# Patient Record
Sex: MALE | Race: WHITE | NOT HISPANIC OR LATINO | RURAL
[De-identification: names, ages, dates, MRNs, and addresses within clinical notes are randomized per-mention and may not be internally consistent; named-entity substitution may affect disease eponyms.]

---

## 2017-06-16 ENCOUNTER — OTHER- (OUTPATIENT)
Dept: RURAL CLINIC 1 | Facility: CLINIC | Age: 82
Setting detail: DERMATOLOGY
End: 2017-06-16

## 2017-06-16 DIAGNOSIS — C44.42 SQUAMOUS CELL CARCINOMA OF SKIN OF SCALP AND NECK: ICD-10-CM

## 2017-06-16 PROCEDURE — 99202 OFFICE O/P NEW SF 15 MIN: CPT

## 2023-08-14 ENCOUNTER — HOSPITAL ENCOUNTER (INPATIENT)
Facility: HOSPITAL | Age: 88
LOS: 3 days | Discharge: NURSING, REHAB OR LONG-TERM CARE | End: 2023-08-17
Attending: FAMILY MEDICINE | Admitting: FAMILY MEDICINE
Payer: MEDICARE

## 2023-08-14 PROBLEM — U07.1 COVID-19 VIRUS INFECTION: Status: ACTIVE | Noted: 2023-08-14

## 2023-08-14 LAB
AMORPH CRY URNS QL MICRO: ABNORMAL
ANION GAP SERPL CALC-SCNC: 6 MMOL/L (ref 5–15)
APPEARANCE UR: CLEAR
BACTERIA URNS QL MICRO: NEGATIVE /HPF
BASOPHILS # BLD: 0 K/UL (ref 0–0.1)
BASOPHILS NFR BLD: 0 % (ref 0–1)
BILIRUB UR QL: NEGATIVE
BUN SERPL-MCNC: 29 MG/DL (ref 6–20)
BUN/CREAT SERPL: 31 (ref 12–20)
CALCIUM SERPL-MCNC: 8.6 MG/DL (ref 8.5–10.1)
CHLORIDE SERPL-SCNC: 107 MMOL/L (ref 97–108)
CO2 SERPL-SCNC: 25 MMOL/L (ref 21–32)
COLOR UR: ABNORMAL
CREAT SERPL-MCNC: 0.93 MG/DL (ref 0.7–1.3)
CRP SERPL-MCNC: 2.37 MG/DL (ref 0–0.6)
D DIMER PPP FEU-MCNC: 1.81 MG/L FEU (ref 0–0.65)
DIFFERENTIAL METHOD BLD: ABNORMAL
EOSINOPHIL # BLD: 0 K/UL (ref 0–0.4)
EOSINOPHIL NFR BLD: 0 % (ref 0–7)
EPITH CASTS URNS QL MICRO: ABNORMAL /LPF
ERYTHROCYTE [DISTWIDTH] IN BLOOD BY AUTOMATED COUNT: 12.6 % (ref 11.5–14.5)
GLUCOSE SERPL-MCNC: 144 MG/DL (ref 65–100)
GLUCOSE UR STRIP.AUTO-MCNC: NEGATIVE MG/DL
HCT VFR BLD AUTO: 38.3 % (ref 36.6–50.3)
HGB BLD-MCNC: 13 G/DL (ref 12.1–17)
HGB UR QL STRIP: ABNORMAL
IMM GRANULOCYTES # BLD AUTO: 0 K/UL
IMM GRANULOCYTES NFR BLD AUTO: 0 %
KETONES UR QL STRIP.AUTO: NEGATIVE MG/DL
LEUKOCYTE ESTERASE UR QL STRIP.AUTO: NEGATIVE
LYMPHOCYTES # BLD: 1.3 K/UL (ref 0.8–3.5)
LYMPHOCYTES NFR BLD: 6 % (ref 12–49)
MCH RBC QN AUTO: 31.7 PG (ref 26–34)
MCHC RBC AUTO-ENTMCNC: 33.9 G/DL (ref 30–36.5)
MCV RBC AUTO: 93.4 FL (ref 80–99)
MONOCYTES # BLD: 0.9 K/UL (ref 0–1)
MONOCYTES NFR BLD: 4 % (ref 5–13)
MYELOCYTES NFR BLD MANUAL: 3 %
NEUTS SEG # BLD: 18.6 K/UL (ref 1.8–8)
NEUTS SEG NFR BLD: 87 % (ref 32–75)
NITRITE UR QL STRIP.AUTO: NEGATIVE
NRBC # BLD: 0 K/UL (ref 0–0.01)
NRBC BLD-RTO: 0 PER 100 WBC
PH UR STRIP: 6.5 (ref 5–8)
PLATELET # BLD AUTO: 317 K/UL (ref 150–400)
PMV BLD AUTO: 10.7 FL (ref 8.9–12.9)
POTASSIUM SERPL-SCNC: 3.8 MMOL/L (ref 3.5–5.1)
PROCALCITONIN SERPL-MCNC: 0.15 NG/ML
PROT UR STRIP-MCNC: NEGATIVE MG/DL
RBC # BLD AUTO: 4.1 M/UL (ref 4.1–5.7)
RBC #/AREA URNS HPF: ABNORMAL /HPF (ref 0–5)
RBC MORPH BLD: ABNORMAL
SODIUM SERPL-SCNC: 138 MMOL/L (ref 136–145)
SP GR UR REFRACTOMETRY: 1.01 (ref 1–1.03)
URINE CULTURE IF INDICATED: ABNORMAL
UROBILINOGEN UR QL STRIP.AUTO: 0.2 EU/DL (ref 0.2–1)
WBC # BLD AUTO: 21.4 K/UL (ref 4.1–11.1)
WBC URNS QL MICRO: ABNORMAL /HPF (ref 0–4)

## 2023-08-14 PROCEDURE — 6360000002 HC RX W HCPCS: Performed by: FAMILY MEDICINE

## 2023-08-14 PROCEDURE — 2580000003 HC RX 258: Performed by: FAMILY MEDICINE

## 2023-08-14 PROCEDURE — 85379 FIBRIN DEGRADATION QUANT: CPT

## 2023-08-14 PROCEDURE — 36415 COLL VENOUS BLD VENIPUNCTURE: CPT

## 2023-08-14 PROCEDURE — 6370000000 HC RX 637 (ALT 250 FOR IP): Performed by: FAMILY MEDICINE

## 2023-08-14 PROCEDURE — 3E0333Z INTRODUCTION OF ANTI-INFLAMMATORY INTO PERIPHERAL VEIN, PERCUTANEOUS APPROACH: ICD-10-PCS | Performed by: HOSPITALIST

## 2023-08-14 PROCEDURE — 81001 URINALYSIS AUTO W/SCOPE: CPT

## 2023-08-14 PROCEDURE — A4216 STERILE WATER/SALINE, 10 ML: HCPCS | Performed by: FAMILY MEDICINE

## 2023-08-14 PROCEDURE — 2500000003 HC RX 250 WO HCPCS: Performed by: FAMILY MEDICINE

## 2023-08-14 PROCEDURE — 80048 BASIC METABOLIC PNL TOTAL CA: CPT

## 2023-08-14 PROCEDURE — 84145 PROCALCITONIN (PCT): CPT

## 2023-08-14 PROCEDURE — 85025 COMPLETE CBC W/AUTO DIFF WBC: CPT

## 2023-08-14 PROCEDURE — 1100000000 HC RM PRIVATE

## 2023-08-14 PROCEDURE — 86140 C-REACTIVE PROTEIN: CPT

## 2023-08-14 PROCEDURE — 94640 AIRWAY INHALATION TREATMENT: CPT

## 2023-08-14 PROCEDURE — 6360000002 HC RX W HCPCS: Performed by: HOSPITALIST

## 2023-08-14 RX ORDER — ACETYLCYSTEINE 200 MG/ML
800 SOLUTION ORAL; RESPIRATORY (INHALATION)
Status: DISCONTINUED | OUTPATIENT
Start: 2023-08-14 | End: 2023-08-15

## 2023-08-14 RX ORDER — ACETAMINOPHEN 650 MG/1
650 SUPPOSITORY RECTAL EVERY 6 HOURS PRN
Status: DISCONTINUED | OUTPATIENT
Start: 2023-08-14 | End: 2023-08-14 | Stop reason: SDUPTHER

## 2023-08-14 RX ORDER — ACETAMINOPHEN 325 MG/1
650 TABLET ORAL EVERY 6 HOURS PRN
Status: DISCONTINUED | OUTPATIENT
Start: 2023-08-14 | End: 2023-08-14 | Stop reason: SDUPTHER

## 2023-08-14 RX ORDER — SODIUM CHLORIDE 0.9 % (FLUSH) 0.9 %
5-40 SYRINGE (ML) INJECTION EVERY 12 HOURS SCHEDULED
Status: DISCONTINUED | OUTPATIENT
Start: 2023-08-14 | End: 2023-08-17 | Stop reason: HOSPADM

## 2023-08-14 RX ORDER — DEXAMETHASONE SODIUM PHOSPHATE 4 MG/ML
10 INJECTION, SOLUTION INTRA-ARTICULAR; INTRALESIONAL; INTRAMUSCULAR; INTRAVENOUS; SOFT TISSUE EVERY 24 HOURS
Status: DISCONTINUED | OUTPATIENT
Start: 2023-08-14 | End: 2023-08-15

## 2023-08-14 RX ORDER — CHLORHEXIDINE GLUCONATE 0.12 MG/ML
15 RINSE ORAL 2 TIMES DAILY
Status: DISCONTINUED | OUTPATIENT
Start: 2023-08-14 | End: 2023-08-17 | Stop reason: HOSPADM

## 2023-08-14 RX ORDER — ONDANSETRON 2 MG/ML
4 INJECTION INTRAMUSCULAR; INTRAVENOUS EVERY 6 HOURS PRN
Status: DISCONTINUED | OUTPATIENT
Start: 2023-08-14 | End: 2023-08-17 | Stop reason: HOSPADM

## 2023-08-14 RX ORDER — ENOXAPARIN SODIUM 100 MG/ML
40 INJECTION SUBCUTANEOUS DAILY
Status: DISCONTINUED | OUTPATIENT
Start: 2023-08-14 | End: 2023-08-17 | Stop reason: HOSPADM

## 2023-08-14 RX ORDER — BENZONATATE 100 MG/1
100 CAPSULE ORAL 3 TIMES DAILY PRN
Status: DISCONTINUED | OUTPATIENT
Start: 2023-08-14 | End: 2023-08-17 | Stop reason: HOSPADM

## 2023-08-14 RX ORDER — ACETAMINOPHEN 650 MG/1
650 SUPPOSITORY RECTAL EVERY 6 HOURS PRN
Status: DISCONTINUED | OUTPATIENT
Start: 2023-08-14 | End: 2023-08-17 | Stop reason: HOSPADM

## 2023-08-14 RX ORDER — SODIUM CHLORIDE 0.9 % (FLUSH) 0.9 %
5-40 SYRINGE (ML) INJECTION PRN
Status: DISCONTINUED | OUTPATIENT
Start: 2023-08-14 | End: 2023-08-17 | Stop reason: HOSPADM

## 2023-08-14 RX ORDER — ACETAMINOPHEN 325 MG/1
650 TABLET ORAL EVERY 6 HOURS PRN
Status: DISCONTINUED | OUTPATIENT
Start: 2023-08-14 | End: 2023-08-17 | Stop reason: HOSPADM

## 2023-08-14 RX ORDER — DEXAMETHASONE SODIUM PHOSPHATE 4 MG/ML
10 INJECTION, SOLUTION INTRA-ARTICULAR; INTRALESIONAL; INTRAMUSCULAR; INTRAVENOUS; SOFT TISSUE EVERY 24 HOURS
Status: DISCONTINUED | OUTPATIENT
Start: 2023-08-14 | End: 2023-08-14

## 2023-08-14 RX ORDER — POLYETHYLENE GLYCOL 3350 17 G/17G
17 POWDER, FOR SOLUTION ORAL DAILY PRN
Status: DISCONTINUED | OUTPATIENT
Start: 2023-08-14 | End: 2023-08-17 | Stop reason: HOSPADM

## 2023-08-14 RX ORDER — FUROSEMIDE 10 MG/ML
20 INJECTION INTRAMUSCULAR; INTRAVENOUS DAILY
Status: COMPLETED | OUTPATIENT
Start: 2023-08-14 | End: 2023-08-16

## 2023-08-14 RX ORDER — IPRATROPIUM BROMIDE AND ALBUTEROL SULFATE 2.5; .5 MG/3ML; MG/3ML
1 SOLUTION RESPIRATORY (INHALATION)
Status: DISCONTINUED | OUTPATIENT
Start: 2023-08-14 | End: 2023-08-15

## 2023-08-14 RX ORDER — SODIUM CHLORIDE 9 MG/ML
INJECTION, SOLUTION INTRAVENOUS PRN
Status: DISCONTINUED | OUTPATIENT
Start: 2023-08-14 | End: 2023-08-17 | Stop reason: HOSPADM

## 2023-08-14 RX ORDER — ONDANSETRON 4 MG/1
4 TABLET, ORALLY DISINTEGRATING ORAL EVERY 8 HOURS PRN
Status: DISCONTINUED | OUTPATIENT
Start: 2023-08-14 | End: 2023-08-17 | Stop reason: HOSPADM

## 2023-08-14 RX ADMIN — IPRATROPIUM BROMIDE AND ALBUTEROL SULFATE 1 DOSE: 2.5; .5 SOLUTION RESPIRATORY (INHALATION) at 16:29

## 2023-08-14 RX ADMIN — ENOXAPARIN SODIUM 40 MG: 100 INJECTION SUBCUTANEOUS at 09:42

## 2023-08-14 RX ADMIN — SODIUM CHLORIDE, PRESERVATIVE FREE 20 MG: 5 INJECTION INTRAVENOUS at 22:19

## 2023-08-14 RX ADMIN — PIPERACILLIN AND TAZOBACTAM 3375 MG: 3; .375 INJECTION, POWDER, LYOPHILIZED, FOR SOLUTION INTRAVENOUS at 07:24

## 2023-08-14 RX ADMIN — BARICITINIB 4 MG: 2 TABLET, FILM COATED ORAL at 09:41

## 2023-08-14 RX ADMIN — IPRATROPIUM BROMIDE AND ALBUTEROL SULFATE 1 DOSE: 2.5; .5 SOLUTION RESPIRATORY (INHALATION) at 08:48

## 2023-08-14 RX ADMIN — FUROSEMIDE 20 MG: 10 INJECTION, SOLUTION INTRAMUSCULAR; INTRAVENOUS at 11:35

## 2023-08-14 RX ADMIN — SODIUM CHLORIDE, PRESERVATIVE FREE 10 ML: 5 INJECTION INTRAVENOUS at 22:20

## 2023-08-14 RX ADMIN — ACETYLCYSTEINE 800 MG: 200 SOLUTION ORAL; RESPIRATORY (INHALATION) at 16:29

## 2023-08-14 RX ADMIN — SODIUM CHLORIDE, PRESERVATIVE FREE 10 ML: 5 INJECTION INTRAVENOUS at 09:46

## 2023-08-14 RX ADMIN — DEXAMETHASONE SODIUM PHOSPHATE 10 MG: 4 INJECTION, SOLUTION INTRAMUSCULAR; INTRAVENOUS at 09:42

## 2023-08-14 RX ADMIN — SODIUM CHLORIDE, PRESERVATIVE FREE 20 MG: 5 INJECTION INTRAVENOUS at 09:41

## 2023-08-14 RX ADMIN — PIPERACILLIN AND TAZOBACTAM 3375 MG: 3; .375 INJECTION, POWDER, LYOPHILIZED, FOR SOLUTION INTRAVENOUS at 15:58

## 2023-08-14 RX ADMIN — ACETYLCYSTEINE 800 MG: 200 SOLUTION ORAL; RESPIRATORY (INHALATION) at 08:48

## 2023-08-14 RX ADMIN — AZITHROMYCIN MONOHYDRATE 500 MG: 500 INJECTION, POWDER, LYOPHILIZED, FOR SOLUTION INTRAVENOUS at 09:46

## 2023-08-14 RX ADMIN — CHLORHEXIDINE GLUCONATE 15 ML: 1.2 RINSE ORAL at 11:36

## 2023-08-14 ASSESSMENT — PAIN DESCRIPTION - ORIENTATION: ORIENTATION: LEFT

## 2023-08-14 ASSESSMENT — PAIN DESCRIPTION - LOCATION: LOCATION: GROIN;LEG

## 2023-08-14 ASSESSMENT — PAIN SCALES - GENERAL: PAINLEVEL_OUTOF10: 10

## 2023-08-14 NOTE — PROGRESS NOTES
Patient refusing labs and taking oxygen off. Oxygen is 94%. He does not want a swallow evaluation at this time.  Education provided, but patient wants to rest.

## 2023-08-14 NOTE — H&P
History and Physical    Date of Service:  8/14/2023  Primary Care Provider: No primary care provider on file. Source of information: Chart review    Chief Complaint: patient does not provide      History of Presenting Illness:   Abilio Reid is a 80 y.o. male with no known significant past (pre-hospital) medical history presented as a direct admission/ transfer from [facility] in [place], [place] to [facility] with diagnoses of COVID 19 virus infection and acute hypoxic respiratory failure. Patient is a non-historian, not answering questions. Majority of history is obtained per review of transfer records from [facility].   Per these reports, patient was hospitalized at [facility] from 8/7/2023 to 8/13/2023. Patient initially was admitted 8/7/2023 after being found down on the floor at home, with cough, hypoxia. Patient was diagnosed with acute hypoxic respiratory failure, pneumonia due to COVID-19 virus infection, asymptomatic bacteriuria, elevated BNP,, leukocytosis, mild hyponatremia, dehydration, elevated D-dimer, thyroid nodule, urinary retention requiring indwelling Richey catheter insertion. Imaging has shown bilateral Monia. Patient was admitted to medical service. Patient was started on remdesivir, baricitinib, Decadron, DuoNebs, and oxygen supplementation. Patient reportedly decompensated on the floor requiring transfer to the ICU with BiPAP. Patient was weaned off of BiPAP and continued on high flow oxygen. He slowly improved, he was weaned to 3 L O2 nasal cannula. Patient reportedly completed 5-day course of IV remdesivir, empirically was treated with Rocephin and azithromycin due to concerns for possible secondary bacterial infection. Fortunately, leukocytosis increased and he had elevated procalcitonin level. Rocephin was discontinued. Zosyn was started and azithromycin was continued.   Patient elevated D-dimer 1.04. CTA of the chest was negative for PE but showed extensive consolidation with groundglass opacities right greater than left and potential thrombus in left IJ. CTA of the neck was performed but showed no thrombus in the left internal jugular vein however the did show thyroid nodule. Outpatient thyroid ultrasound was recommended but not done. Patient reportedly worked with PT/OT and was able to sit in a chair several times a day. He reportedly had decreased appetite. Per discharge record, family requested patient transferred to [facility] so that patient may be closer to family. Patient is now seen for admission to the hospitalist service. On arrival at the bedside, patient is noncooperative. He is not answering questions. He states that he wants to be left alone. Intermittently, he complains of pain in his leg thigh/ groin. There was no reports of extremity pain noted in transfer reports. Of note, bilateral venous duplex scan on 8/8/2023 showed no evidence of DVT. Since arrival here, nurse notes patient has been talking of despair refusing diagnostic tests, and treatment.        REVIEW OF SYSTEMS:  Unable to obtain review of systems as patient is not answering questions    Past Medical History:   Diagnosis Date    Hypertension       MEDICATIONS:  Prior to Admission medications    Not on File     ALLERGIES:  NO KNOWN DRUG ALLERGIES    FAMILY HISTORY:  Unknown regarding chronic family medical condition    SOCIAL HISTORY:  Smoking:  unknown  Alcohol:  unknown    Social Determinants of Health     Tobacco Use: Not on file   Alcohol Use: Not on file   Financial Resource Strain: Not on file   Food Insecurity: Not on file   Transportation Needs: Not on file   Physical Activity: Not on file   Stress: Not on file   Social Connections: Not on file   Intimate Partner Violence: Not on file   Depression: Not on file   Housing Stability: Not on file        Medications were reconciled to the best

## 2023-08-14 NOTE — CARE COORDINATION
Care Management Initial Assessment       RUR: 7%  Readmission? No  1st IM letter given? Yes -  1st  letter given: No         08/14/23 1223   Service Assessment   Patient Orientation Alert and Oriented   Cognition Alert   History Provided By Child/Family   Primary Caregiver Private caregiver   [address] is: Legal Next of Kin   PCP Verified by CM Yes   Last Visit to PCP Within last 3 months   Prior Functional Level Assistance with the following:;Shopping;Mobility;Cooking   Current Functional Level Assistance with the following:;Bathing;Dressing; Toileting;Mobility   Can patient return to prior living arrangement No   Ability to make needs known: Fair   Family able to assist with home care needs: Yes   Would you like for me to discuss the discharge plan with any other family members/significant others, and if so, who? Yes  Willy Shaffer, daughter)   Financial Resources Medicare   Social/Functional History   Lives With Alone;Home care staff   Type of Home House   Discharge Planning   Type of [facility] Alone   Patient expects to be discharged to: [facility] Discharge   Mode of Transport at Discharge Other (see comment)  (BLS vs wheelchair)   Confirm Follow Up Transport Family     CM spoke with his daughter, Farhan who provided the above information. MD would like referral sent to SNF, discussion was in IDRs. Nidhi present  from hospitalist group. Farhan, patient daughter would like referral to [facility]. Referral accepted, patient will need pt/ot milly for insurance authorization approval.     CM attempted to start auth, however [facility] will have to continue with authorization.      Bettye Zuleta, [address] Management Department  CM:[phone]

## 2023-08-14 NOTE — PROGRESS NOTES
1700 - Report received from MARIANNA Ruiz and care assumed of patient. 1710 - Assessment completed. Patient resting in semi-fowlers with his glasses and hearing aids in. Pt is very hard of hearing, even with the hearing aids. Patient refused to be readjusted in bed as well as offloading heels. Pt has soft Bps, last reading at 98/53, RN to encourage oral fluids per MD. Patient appears flat and withdrawn. RN offered assistance with dinner tray, pt refused. Will continue to encourage oral intake and turns. 1900 - Pt allowed RN and PCT to reposition him in bed, pt now semi-fowlers with HOB at 45 and heels offloaded. UA and urine culture collected and sent to lab. Pt still refusing dinner. Offered water, pt refused. 1930 - Bedside and Verbal shift change report given to Kuldip Hong RN (oncoming nurse) by Ginger Bruce RN (offgoing nurse). Report included the following information Nurse Handoff Report, Index, Intake/Output, MAR, Recent Results, and Cardiac Rhythm NSR .

## 2023-08-14 NOTE — ACP (ADVANCE CARE PLANNING)
Advance Care Planning     Advance Care Planning (ACP) Physician/NP/PA Conversation    Date of Conversation: 8/14/2023  Conducted with: Patient with Decision Making Capacity   Healthcare Decision Maker: Named in Advance Directive or Healthcare Power of  (name) daughter    Healthcare Decision Maker:  No healthcare decision makers have been documented. Click here to complete [address] including selection of the Healthcare Decision Maker Relationship (ie \"Primary\")         Care Preferences:    Hospitalization: \"If your health worsens and it becomes clear that your chance of recovery is unlikely, what would be your preference regarding hospitalization? \"  The patient would prefer hospitalization. Ventilation: \"If you were unable to breath on your own and your chance of recovery was unlikely, what would be your preference about the use of a ventilator (breathing machine) if it was available to you? \"  The patient would NOT desire the use of a ventilator. Resuscitation: \"In the event your heart stopped as a result of an underlying serious health condition, would you want attempts made to restart your heart, or would you prefer a natural death? \"  No, do NOT attempt to resuscitate.     treatment goals, benefit/burden of treatment options, artificial nutrition, ventilation preferences, hospitalization preferences, and resuscitation preferences    Conversation Outcomes / Follow-Up Plan:  ACP complete - no further action today  Reviewed DNR/DNI and patient confirms current DNR status - completed forms on file (place new order if needed)    Length of Voluntary ACP Conversation in minutes:  16 minutes    Lucille Sadler MD

## 2023-08-14 NOTE — PROGRESS NOTES
Hospitalist Progress Note  Yaa Byers MD  Answering service: [phone] from in house phone        Date of Service:  2023  NAME:  Mady Payne  :  1933  MRN:  [id number]      Admission Summary:   Mady Payne is a 80 y.o. male with no known significant past (pre-hospital) medical history presented as a direct admission/ transfer from [facility] in [place], [place] to [facility] with diagnoses of COVID 19 virus infection and acute hypoxic respiratory failure. Patient is a non-historian, not answering questions. Majority of history is obtained per review of transfer records from [facility].   Per these reports, patient was hospitalized at [facility] from 2023 to 2023. Patient initially was admitted 2023 after being found down on the floor at home, with cough, hypoxia. Patient was diagnosed with acute hypoxic respiratory failure, pneumonia due to COVID-19 virus infection, asymptomatic bacteriuria, elevated BNP,, leukocytosis, mild hyponatremia, dehydration, elevated D-dimer, thyroid nodule, urinary retention requiring indwelling Richey catheter insertion. Imaging has shown bilateral Monia. Patient was admitted to medical service. Patient was started on remdesivir, baricitinib, Decadron, DuoNebs, and oxygen supplementation. Patient reportedly decompensated on the floor requiring transfer to the ICU with BiPAP. Patient was weaned off of BiPAP and continued on high flow oxygen. He slowly improved, he was weaned to 3 L O2 nasal cannula. Patient reportedly completed 5-day course of IV remdesivir, empirically was treated with Rocephin and azithromycin due to concerns for possible secondary bacterial infection. Fortunately, leukocytosis increased and he had elevated procalcitonin level.   Rocephin was

## 2023-08-15 LAB
25(OH)D3 SERPL-MCNC: 51.3 NG/ML (ref 30–100)
ANION GAP SERPL CALC-SCNC: 10 MMOL/L (ref 5–15)
BASOPHILS # BLD: 0 K/UL (ref 0–0.1)
BASOPHILS NFR BLD: 0 % (ref 0–1)
BUN SERPL-MCNC: 30 MG/DL (ref 6–20)
BUN/CREAT SERPL: 26 (ref 12–20)
CALCIUM SERPL-MCNC: 8.7 MG/DL (ref 8.5–10.1)
CHLORIDE SERPL-SCNC: 101 MMOL/L (ref 97–108)
CO2 SERPL-SCNC: 25 MMOL/L (ref 21–32)
CREAT SERPL-MCNC: 1.16 MG/DL (ref 0.7–1.3)
DIFFERENTIAL METHOD BLD: ABNORMAL
EOSINOPHIL # BLD: 0 K/UL (ref 0–0.4)
EOSINOPHIL NFR BLD: 0 % (ref 0–7)
ERYTHROCYTE [DISTWIDTH] IN BLOOD BY AUTOMATED COUNT: 12.5 % (ref 11.5–14.5)
GLUCOSE SERPL-MCNC: 119 MG/DL (ref 65–100)
HCT VFR BLD AUTO: 44.4 % (ref 36.6–50.3)
HGB BLD-MCNC: 15.1 G/DL (ref 12.1–17)
IMM GRANULOCYTES # BLD AUTO: 0 K/UL
IMM GRANULOCYTES NFR BLD AUTO: 0 %
LYMPHOCYTES # BLD: 1.2 K/UL (ref 0.8–3.5)
LYMPHOCYTES NFR BLD: 5 % (ref 12–49)
MCH RBC QN AUTO: 31.7 PG (ref 26–34)
MCHC RBC AUTO-ENTMCNC: 34 G/DL (ref 30–36.5)
MCV RBC AUTO: 93.1 FL (ref 80–99)
METAMYELOCYTES NFR BLD MANUAL: 2 %
MONOCYTES # BLD: 0.5 K/UL (ref 0–1)
MONOCYTES NFR BLD: 2 % (ref 5–13)
MYELOCYTES NFR BLD MANUAL: 3 %
NEUTS SEG # BLD: 20.4 K/UL (ref 1.8–8)
NEUTS SEG NFR BLD: 88 % (ref 32–75)
NRBC # BLD: 0 K/UL (ref 0–0.01)
NRBC BLD-RTO: 0 PER 100 WBC
PLATELET # BLD AUTO: 325 K/UL (ref 150–400)
PMV BLD AUTO: 11.5 FL (ref 8.9–12.9)
POTASSIUM SERPL-SCNC: 5.3 MMOL/L (ref 3.5–5.1)
RBC # BLD AUTO: 4.77 M/UL (ref 4.1–5.7)
RBC MORPH BLD: ABNORMAL
SODIUM SERPL-SCNC: 136 MMOL/L (ref 136–145)
WBC # BLD AUTO: 23.2 K/UL (ref 4.1–11.1)

## 2023-08-15 PROCEDURE — A4216 STERILE WATER/SALINE, 10 ML: HCPCS | Performed by: FAMILY MEDICINE

## 2023-08-15 PROCEDURE — 94640 AIRWAY INHALATION TREATMENT: CPT

## 2023-08-15 PROCEDURE — 6360000002 HC RX W HCPCS: Performed by: HOSPITALIST

## 2023-08-15 PROCEDURE — 6370000000 HC RX 637 (ALT 250 FOR IP): Performed by: NURSE PRACTITIONER

## 2023-08-15 PROCEDURE — 85025 COMPLETE CBC W/AUTO DIFF WBC: CPT

## 2023-08-15 PROCEDURE — 6370000000 HC RX 637 (ALT 250 FOR IP): Performed by: HOSPITALIST

## 2023-08-15 PROCEDURE — 6370000000 HC RX 637 (ALT 250 FOR IP): Performed by: FAMILY MEDICINE

## 2023-08-15 PROCEDURE — 97165 OT EVAL LOW COMPLEX 30 MIN: CPT

## 2023-08-15 PROCEDURE — 97530 THERAPEUTIC ACTIVITIES: CPT

## 2023-08-15 PROCEDURE — 36415 COLL VENOUS BLD VENIPUNCTURE: CPT

## 2023-08-15 PROCEDURE — 51798 US URINE CAPACITY MEASURE: CPT

## 2023-08-15 PROCEDURE — 2580000003 HC RX 258: Performed by: FAMILY MEDICINE

## 2023-08-15 PROCEDURE — 80048 BASIC METABOLIC PNL TOTAL CA: CPT

## 2023-08-15 PROCEDURE — 97161 PT EVAL LOW COMPLEX 20 MIN: CPT

## 2023-08-15 PROCEDURE — 97116 GAIT TRAINING THERAPY: CPT

## 2023-08-15 PROCEDURE — 6360000002 HC RX W HCPCS: Performed by: FAMILY MEDICINE

## 2023-08-15 PROCEDURE — 2500000003 HC RX 250 WO HCPCS: Performed by: FAMILY MEDICINE

## 2023-08-15 PROCEDURE — 1100000000 HC RM PRIVATE

## 2023-08-15 PROCEDURE — 82306 VITAMIN D 25 HYDROXY: CPT

## 2023-08-15 RX ORDER — LIDOCAINE HYDROCHLORIDE 20 MG/ML
JELLY TOPICAL ONCE
Status: COMPLETED | OUTPATIENT
Start: 2023-08-16 | End: 2023-08-15

## 2023-08-15 RX ORDER — ACETYLCYSTEINE 200 MG/ML
800 SOLUTION ORAL; RESPIRATORY (INHALATION)
Status: DISCONTINUED | OUTPATIENT
Start: 2023-08-15 | End: 2023-08-17 | Stop reason: HOSPADM

## 2023-08-15 RX ORDER — IPRATROPIUM BROMIDE AND ALBUTEROL SULFATE 2.5; .5 MG/3ML; MG/3ML
1 SOLUTION RESPIRATORY (INHALATION)
Status: DISCONTINUED | OUTPATIENT
Start: 2023-08-15 | End: 2023-08-17 | Stop reason: HOSPADM

## 2023-08-15 RX ORDER — LIDOCAINE HYDROCHLORIDE 20 MG/ML
JELLY TOPICAL PRN
Status: DISCONTINUED | OUTPATIENT
Start: 2023-08-15 | End: 2023-08-15

## 2023-08-15 RX ADMIN — ACETYLCYSTEINE 800 MG: 200 SOLUTION ORAL; RESPIRATORY (INHALATION) at 20:17

## 2023-08-15 RX ADMIN — SODIUM CHLORIDE, PRESERVATIVE FREE 10 ML: 5 INJECTION INTRAVENOUS at 09:51

## 2023-08-15 RX ADMIN — IPRATROPIUM BROMIDE AND ALBUTEROL SULFATE 1 DOSE: 2.5; .5 SOLUTION RESPIRATORY (INHALATION) at 20:17

## 2023-08-15 RX ADMIN — SODIUM CHLORIDE, PRESERVATIVE FREE 20 MG: 5 INJECTION INTRAVENOUS at 09:47

## 2023-08-15 RX ADMIN — FUROSEMIDE 20 MG: 10 INJECTION, SOLUTION INTRAMUSCULAR; INTRAVENOUS at 09:45

## 2023-08-15 RX ADMIN — PIPERACILLIN AND TAZOBACTAM 3375 MG: 3; .375 INJECTION, POWDER, LYOPHILIZED, FOR SOLUTION INTRAVENOUS at 17:14

## 2023-08-15 RX ADMIN — AZITHROMYCIN MONOHYDRATE 500 MG: 500 INJECTION, POWDER, LYOPHILIZED, FOR SOLUTION INTRAVENOUS at 09:50

## 2023-08-15 RX ADMIN — PIPERACILLIN AND TAZOBACTAM 3375 MG: 3; .375 INJECTION, POWDER, LYOPHILIZED, FOR SOLUTION INTRAVENOUS at 00:32

## 2023-08-15 RX ADMIN — BARICITINIB 4 MG: 2 TABLET, FILM COATED ORAL at 11:02

## 2023-08-15 RX ADMIN — PIPERACILLIN AND TAZOBACTAM 3375 MG: 3; .375 INJECTION, POWDER, LYOPHILIZED, FOR SOLUTION INTRAVENOUS at 09:50

## 2023-08-15 RX ADMIN — ENOXAPARIN SODIUM 40 MG: 100 INJECTION SUBCUTANEOUS at 09:46

## 2023-08-15 RX ADMIN — CHLORHEXIDINE GLUCONATE 15 ML: 1.2 RINSE ORAL at 09:48

## 2023-08-15 RX ADMIN — DEXAMETHASONE SODIUM PHOSPHATE 10 MG: 4 INJECTION, SOLUTION INTRAMUSCULAR; INTRAVENOUS at 09:48

## 2023-08-15 RX ADMIN — LIDOCAINE HYDROCHLORIDE: 20 JELLY TOPICAL at 23:55

## 2023-08-15 RX ADMIN — SODIUM CHLORIDE, PRESERVATIVE FREE 10 ML: 5 INJECTION INTRAVENOUS at 20:15

## 2023-08-15 RX ADMIN — SODIUM CHLORIDE, PRESERVATIVE FREE 20 MG: 5 INJECTION INTRAVENOUS at 20:14

## 2023-08-15 NOTE — PLAN OF CARE
Problem: Safety - Adult  Goal: Free from fall injury  8/15/2023 1505 by Medford Litten, RN  Outcome: Progressing     Problem: Pain  Goal: Verbalizes/displays adequate comfort level or baseline comfort level  Outcome: Completed

## 2023-08-15 NOTE — PLAN OF CARE
Problem: Occupational Therapy - Adult  Goal: By Discharge: Performs self-care activities at highest level of function for planned discharge setting. See evaluation for individualized goals. Description: FUNCTIONAL STATUS PRIOR TO ADMISSION:  Patient was ambulatory using RW, mod I for toileting, has a care aide daily who will assist pt with bathing and dressing as needed. Pt is very [place] and granddaughter present to assist with PLOF. Receives Help From: Personal care attendant, Outpatient therapy for strengthening and balance,       HOME SUPPORT: Patient lived alone with care aide and family to provide assistance. Occupational Therapy Goals:  Initiated 8/15/2023  1. Patient will perform standing ADLs at sink with Supervision within 7 day(s). 2.  Patient will perform upper body dressing and lower body dressing with Supervision within 7 day(s). 3.  Patient will perform bathing with Minimal Assist within 7 day(s). 4.  Patient will perform toilet transfers with Supervision  within 7 day(s). 5.  Patient will perform all aspects of toileting with Supervision within 7 day(s). Outcome: Progressing       OCCUPATIONAL THERAPY EVALUATION    Patient: Radha Pooz (65 y.o. male)  Date: 8/15/2023  Primary Diagnosis: COVID-19 virus infection [U07.1]         Precautions: Bed Alarm   pt is very [place]    ASSESSMENT :  The patient is limited by decreased functional mobility, independence in ADLs, high-level IADLs, ROM, strength, balance. Based on the impairments listed above pt presents with decreased activity tolerance, generalized weakness, risk for falls and decline in functional status for ADLs/IADLs s/p admission for COVID 19, bilateral PNA, and acute respiratory failure. Pt transferred from other hospital where he had been on BIPAP, HFNC due to respiratory failure. Overall, pt required min A for bed mobility, sit<>stand with RW. Pt with one posterior LOB.  VSS on room air, 90% post activity up to 94% with seated need for supervision renders the patient not independent. 4. A patient's performance should be established using the best available evidence. Asking the patient, friends/relatives and nurses are the usual sources, but direct observation and common sense are also important. However direct testing is not needed. 5. Usually the patient's performance over the preceding 24-48 hours is important, but occasionally longer periods will be relevant. 6. Middle categories imply that the patient supplies over 50 per cent of the effort. 7. Use of aids to be independent is allowed. Score Interpretation (from [address] [place] [address])    Independent   60-79 Minimally independent   40-59 Partially dependent   20-39 Very dependent   <20 Totally dependent     -Wilfred Farnsworth., Barthel, DNancyW. (1965). Functional evaluation: the Barthel Index. [address] [facility] (). The Barthel activities of daily living index: self-reporting versus actual performance in the old (> or = 75 years). Journal of [address] [place] [address] 45(7), [address], JCADENCEF, Nghia Akbar., Sonal Esparza. (1999). Measuring the change in disability after inpatient rehabilitation; comparison of the responsiveness of the Barthel Index and Functional Bee Measure. Journal of Neurology, Neurosurgery, and Psychiatry, 66(4), 590-174. Jocelyn Traylor, N.J.A, SHANNAN Kern, & Dee Yu MNancyA. (2004) Assessment of post-stroke quality of life in cost-effectiveness studies: The usefulness of the Barthel Index and the EuroQoL-5D. Quality of Life Research, 13, 427-43                                                                                                                                                                                                                                 Pain Ratin/10   Pain Intervention(s):        Activity Tolerance:   Fair     After treatment:   Patient left in no apparent distress sitting up in chair, Call bell within reach, Bed/ chair alarm activated, and Caregiver / family present    COMMUNICATION/EDUCATION:   The patient's plan of care was discussed with: physical therapist and registered nurse         Thank you for this referral.  Tram Garcia OT  Minutes: 30    Occupational Therapy Evaluation Charge Determination   History Examination Decision-Making   LOW Complexity : Brief history review  LOW Complexity: 1-3 Performance deficits relating to physical, cognitive, or psychosocial skills that result in activity limitations and/or participation restrictions LOW Complexity: No comorbidities that affect functional and  no verbal  or physical assist needed to complete eval tasks   Based on the above components, the patient evaluation is determined to be of the following complexity level: Low

## 2023-08-15 NOTE — PROGRESS NOTES
Patient urinated a small amount with his bowel movement around 1300 which was unmeasured. Then at approx 1715 Patients gown, sheets were saturated in urine. Bladder scanned patient yielding 597mL, while I was setting up to straight cath patient urinated. Attempted to straight cath but was met with resistance, felt like the catheter was coiling. Bladder scanned again yielding 547mL MD Cat Notified     [phone] Instructed to have someone help me    1900 Charge RN Breanne Pritchard attempted to straight cath with a coude and was unsuccessful.      1930 MD notified

## 2023-08-15 NOTE — PROGRESS NOTES
1930 Bedside and Verbal shift change report given to Dermott (oncoming nurse) by Rachna Fernandez (offgoing nurse). Report included the following information Nurse Handoff Report, Adult Overview, Intake/Output, and MAR.      0730 Bedside and Verbal shift change report given to Thom Modi (oncoming nurse) by Dermott (offgoing nurse). Report included the following information nurse handoff report, adult overview, and MAR.

## 2023-08-15 NOTE — PROGRESS NOTES
Hospitalist Progress Note  Sara Roldan MD  Answering service: [phone] from in house phone        Date of Service:  8/15/2023  NAME:  Lakeisha Peterson  :  1933  MRN:  [id number]      Admission Summary:   Lakeisha Peterson is a 80 y.o. male with no known significant past (pre-hospital) medical history presented as a direct admission/ transfer from [facility] in [place], [place] to [facility] with diagnoses of COVID 19 virus infection and acute hypoxic respiratory failure. Patient is a non-historian, not answering questions. Majority of history is obtained per review of transfer records from [facility].   Per these reports, patient was hospitalized at [facility] from 2023 to 2023. Patient initially was admitted 2023 after being found down on the floor at home, with cough, hypoxia. Patient was diagnosed with acute hypoxic respiratory failure, pneumonia due to COVID-19 virus infection, asymptomatic bacteriuria, elevated BNP,, leukocytosis, mild hyponatremia, dehydration, elevated D-dimer, thyroid nodule, urinary retention requiring indwelling Richey catheter insertion. Imaging has shown bilateral Monia. Patient was admitted to medical service. Patient was started on remdesivir, baricitinib, Decadron, DuoNebs, and oxygen supplementation. Patient reportedly decompensated on the floor requiring transfer to the ICU with BiPAP. Patient was weaned off of BiPAP and continued on high flow oxygen. He slowly improved, he was weaned to 3 L O2 nasal cannula. Patient reportedly completed 5-day course of IV remdesivir, empirically was treated with Rocephin and azithromycin due to concerns for possible secondary bacterial infection. Fortunately, leukocytosis increased and he had elevated procalcitonin level.   Rocephin was

## 2023-08-16 ENCOUNTER — APPOINTMENT (OUTPATIENT)
Facility: HOSPITAL | Age: 88
End: 2023-08-16
Attending: FAMILY MEDICINE
Payer: MEDICARE

## 2023-08-16 PROCEDURE — 6370000000 HC RX 637 (ALT 250 FOR IP): Performed by: HOSPITALIST

## 2023-08-16 PROCEDURE — 1100000000 HC RM PRIVATE

## 2023-08-16 PROCEDURE — 2580000003 HC RX 258: Performed by: FAMILY MEDICINE

## 2023-08-16 PROCEDURE — 2700000000 HC OXYGEN THERAPY PER DAY

## 2023-08-16 PROCEDURE — 6360000002 HC RX W HCPCS: Performed by: FAMILY MEDICINE

## 2023-08-16 PROCEDURE — 70450 CT HEAD/BRAIN W/O DYE: CPT

## 2023-08-16 PROCEDURE — 6360000002 HC RX W HCPCS: Performed by: HOSPITALIST

## 2023-08-16 PROCEDURE — 94640 AIRWAY INHALATION TREATMENT: CPT

## 2023-08-16 PROCEDURE — 51702 INSERT TEMP BLADDER CATH: CPT

## 2023-08-16 PROCEDURE — A4216 STERILE WATER/SALINE, 10 ML: HCPCS | Performed by: FAMILY MEDICINE

## 2023-08-16 PROCEDURE — 2500000003 HC RX 250 WO HCPCS: Performed by: FAMILY MEDICINE

## 2023-08-16 PROCEDURE — 6370000000 HC RX 637 (ALT 250 FOR IP): Performed by: FAMILY MEDICINE

## 2023-08-16 RX ADMIN — CHLORHEXIDINE GLUCONATE 15 ML: 1.2 RINSE ORAL at 11:11

## 2023-08-16 RX ADMIN — SODIUM CHLORIDE, PRESERVATIVE FREE 20 MG: 5 INJECTION INTRAVENOUS at 10:32

## 2023-08-16 RX ADMIN — FUROSEMIDE 20 MG: 10 INJECTION, SOLUTION INTRAMUSCULAR; INTRAVENOUS at 10:33

## 2023-08-16 RX ADMIN — SODIUM CHLORIDE, PRESERVATIVE FREE 5 ML: 5 INJECTION INTRAVENOUS at 22:41

## 2023-08-16 RX ADMIN — ACETYLCYSTEINE 800 MG: 200 SOLUTION ORAL; RESPIRATORY (INHALATION) at 08:32

## 2023-08-16 RX ADMIN — PIPERACILLIN AND TAZOBACTAM 3375 MG: 3; .375 INJECTION, POWDER, LYOPHILIZED, FOR SOLUTION INTRAVENOUS at 00:08

## 2023-08-16 RX ADMIN — BARICITINIB 4 MG: 2 TABLET, FILM COATED ORAL at 11:27

## 2023-08-16 RX ADMIN — DEXAMETHASONE 6 MG: 4 TABLET ORAL at 10:32

## 2023-08-16 RX ADMIN — IPRATROPIUM BROMIDE AND ALBUTEROL SULFATE 1 DOSE: 2.5; .5 SOLUTION RESPIRATORY (INHALATION) at 20:19

## 2023-08-16 RX ADMIN — ACETYLCYSTEINE 800 MG: 200 SOLUTION ORAL; RESPIRATORY (INHALATION) at 20:19

## 2023-08-16 RX ADMIN — IPRATROPIUM BROMIDE AND ALBUTEROL SULFATE 1 DOSE: 2.5; .5 SOLUTION RESPIRATORY (INHALATION) at 08:32

## 2023-08-16 RX ADMIN — ENOXAPARIN SODIUM 40 MG: 100 INJECTION SUBCUTANEOUS at 11:12

## 2023-08-16 RX ADMIN — AZITHROMYCIN MONOHYDRATE 500 MG: 500 INJECTION, POWDER, LYOPHILIZED, FOR SOLUTION INTRAVENOUS at 10:54

## 2023-08-16 RX ADMIN — CHLORHEXIDINE GLUCONATE 15 ML: 1.2 RINSE ORAL at 22:41

## 2023-08-16 RX ADMIN — SODIUM CHLORIDE, PRESERVATIVE FREE 10 ML: 5 INJECTION INTRAVENOUS at 10:31

## 2023-08-16 NOTE — CARE COORDINATION
Transition of Care Plan:     RUR: 9%  Prior Level of Functioning:  independent    Disposition: SNF   If SNF or IPR: Date FOC offered: 8/14/23  Date [address] received: 8/14/23  Accepting facility: [address] [place] [address] of [address]  Date authorization started with reference number: 8/14/23  Date authorization received and expires: tbd  Follow up appointments: pcp/specialist   DME needed: defer to SNF   Transportation at discharge: BLS vs wheelchair   IM/IMM Medicare/ letter given: will need prior to d/c   Caregiver Contact: daughterCarmen [phone]     Discharge Caregiver contacted prior to discharge? yes  Care Conference needed? no  Barriers to discharge: insurance auth. Cm spoke with Antonio Roberson at [address] [place] [address], she said Melanee Bump is still pending.      Aleksey Yuan, RN/CRM  [phone]

## 2023-08-16 NOTE — PROGRESS NOTES
Renal Dosing/Monitoring  Medication: Famotidine   Current regimen:  20 mg IV BID  Recent Labs     08/14/23  1253 08/15/23  1323   CREATININE 0.93 1.16   BUN 29* 30*     Estimated CrCl:  47 ml/min  Plan: Change to famotidine 20 mg IV daily per [facility] P&T Committee Protocol with respect to renal function (CrCl < 50 ml/min). Pharmacy will continue to monitor patient daily and will make dosage adjustments based upon changing renal function.

## 2023-08-16 NOTE — PROGRESS NOTES
Hospitalist Progress Note  Lucille Sadler MD  Answering service: [phone] from in house phone        Date of Service:  2023  NAME:  Jorge Dumont  :  1933  MRN:  [id number]      Admission Summary:   Jorge Dumont is a 80 y.o. male with no known significant past (pre-hospital) medical history presented as a direct admission/ transfer from [facility] in [place], [place] to [facility] with diagnoses of COVID 19 virus infection and acute hypoxic respiratory failure. Patient is a non-historian, not answering questions. Majority of history is obtained per review of transfer records from [facility].   Per these reports, patient was hospitalized at [facility] from 2023 to 2023. Patient initially was admitted 2023 after being found down on the floor at home, with cough, hypoxia. Patient was diagnosed with acute hypoxic respiratory failure, pneumonia due to COVID-19 virus infection, asymptomatic bacteriuria, elevated BNP,, leukocytosis, mild hyponatremia, dehydration, elevated D-dimer, thyroid nodule, urinary retention requiring indwelling Richey catheter insertion. Imaging has shown bilateral Monia. Patient was admitted to medical service. Patient was started on remdesivir, baricitinib, Decadron, DuoNebs, and oxygen supplementation. Patient reportedly decompensated on the floor requiring transfer to the ICU with BiPAP. Patient was weaned off of BiPAP and continued on high flow oxygen. He slowly improved, he was weaned to 3 L O2 nasal cannula. Patient reportedly completed 5-day course of IV remdesivir, empirically was treated with Rocephin and azithromycin due to concerns for possible secondary bacterial infection. Fortunately, leukocytosis increased and he had elevated procalcitonin level.   Rocephin was discontinued. Zosyn was started and azithromycin was continued. Patient elevated D-dimer 1.04. CTA of the chest was negative for PE but showed extensive consolidation with groundglass opacities right greater than left and potential thrombus in left IJ. CTA of the neck was performed but showed no thrombus in the left internal jugular vein however the did show thyroid nodule. Outpatient thyroid ultrasound was recommended but not done. Patient reportedly worked with PT/OT and was able to sit in a chair several times a day. He reportedly had decreased appetite. Per discharge record, family requested patient transferred to [facility] so that patient may be closer to family. Patient is now seen for admission to the hospitalist service. On arrival at the bedside, patient is noncooperative. He is not answering questions. He states that he wants to be left alone. Intermittently, he complains of pain in his leg thigh/ groin. There was no reports of extremity pain noted in transfer reports. Of note, bilateral venous duplex scan on 8/8/2023 showed no evidence of DVT. Since arrival here, nurse notes patient has been talking of despair refusing diagnostic tests, and treatment. Interval history / Subjective:   Pt seen and examined , discussed with RN wean off oxygen as tolerates  Noticed urinary retention and Richey catheter is now back keep for 2 weeks and then voiding trial with [place] [facility] as an outpatient  Patient awaiting [facility] authorization otherwise stable for discharge     Assessment & Plan:     1. Acute respiratory failure with hypoxia  -Due to bilateral COVID-19 pneumonia  -Titrate O2 to wean off oxygen  -Nebs per COVID protocol     2. COVID-19 virus infection  -Continue dexamethasone to complete 10 days of therapy switch to oral  -Plan as above     3.   Leukocytosis unspecified  -Had been on ceftriaxone and azithromycin;  -Patient is also on high-dose of steroid he has no fever repeat Or    ondansetron (ZOFRAN) injection 4 mg  4 mg IntraVENous Q6H PRN    polyethylene glycol (GLYCOLAX) packet 17 g  17 g Oral Daily PRN    acetaminophen (TYLENOL) tablet 650 mg  650 mg Oral Q6H PRN    Or    acetaminophen (TYLENOL) suppository 650 mg  650 mg Rectal Q6H PRN    benzonatate (TESSALON) capsule 100 mg  100 mg Oral TID PRN    azithromycin (ZITHROMAX) 500 mg in sodium chloride 0.9 % 250 mL IVPB ([id number])  500 mg IntraVENous Q24H    enoxaparin (LOVENOX) injection 40 mg  40 mg SubCUTAneous Daily    baricitinib (OLUMIANT) tablet 4 mg  4 mg Oral Daily    chlorhexidine (PERIDEX) 0.12 % solution 15 mL  15 mL Mouth/Throat BID    famotidine (PEPCID) 20 mg in sodium chloride (PF) 0.9 % 10 mL injection  20 mg IntraVENous BID     ______________________________________________________________________  EXPECTED LENGTH OF STAY: Unable to retrieve estimated LOS  ACTUAL LENGTH OF STAY:          2                 Lukas Mullen MD

## 2023-08-16 NOTE — CONSULTS
Requesting Provider: Dipesh Campa MD - Reason for Consultation: \"urinary retention\"  Pre-existing [place] [facility] Patient:   No                Patient: Gildardo Barron MRN: [de-identified]  SSN: [id number]    YOB: 1933  Age: 80 y.o. Sex: male     Location: [facility]       Code Status: DNR   PCP: Joaquina Lockwood MD  - [phone]   Emergency Contact:  Primary Emergency Contact: Renetta Gutierrez   Race/Rastafari/Language: White (non-) /  / Mariah Marquez   Payor: Payor: Robert Morales / Plan: Nathan Solomon / Product Type: *No Product type* /    Prior Admission Data:         Hospitalized:  Hospital Day: 3 - Admitted 8/14/2023 12:43 AM     CONSULTANTS  IP CONSULT TO PHYSICAL THERAPY  IP CONSULT TO OCCUPATIONAL THERAPY  IP CONSULT TO UROLOGY   ADMISSION DIAGNOSES  [unfilled]      Assessment/Plan:       Urinary Retention - patient arrived with a russell catheter for reported retention. He failed voiding trial and russell was replaced on 8/16. Maintain russell for ~2 weeks and until patient improved from medical standpoint and more mobile. Start Flomax if patient hemodynamically stable to tolerate. He can follow up with Urology closer to his home or [facility] is available and happy to manage this. Our contact information was provided in the chart. Please call if we can be of further assistance. CC: [unfilled]   HPI: He is a 80 y.o. male with PMH of BPH and dyslipidemia presented as a direct admission/ transfer from [facility] in [place], [place] to [facility] with Acute respiratory failure with hypoxia due to bilateral COVID-19 pneumonia. Urology is consulted for urinary retention. Per chart review, he has a hx of BPH managed by his PCP, does not appear to be on any medical management for this. Because the patient is a non-historian, not answering questions, and has active COVID 19 infection, patient not seen and chart reviewed.   He arrived to [address]

## 2023-08-16 NOTE — PROGRESS NOTES
Contacted the on call Dr. Alea Ruiz (Urology) (# [phone]). Spoke/page with Wendy Brought and left the message for this patient current condition. Waiting for the Call from on call urology.

## 2023-08-16 NOTE — PROGRESS NOTES
During the shift change report, notified this RN that morning RN or other staff RN couldn't performed the Straith cath due to resistance. 20:27 This RN performed the bladder scan which was showing 476cc    21:30 This RN performed straight cath with help of other staff RN - Aram Staples and rapid response Saad Oconnor. Tried regular and coude straight cath. Unsuccessful for straight cath. Re-performed the bladder scan. Recent bladder scan was 815 cc. Notified the NP- Alin Ibrahim through perfect serve and requesting to call back. Got the order for urology consult.

## 2023-08-16 NOTE — PROGRESS NOTES
Bedside shift change report given to Fran Giron RN (oncoming nurse) [facility] Report included the following information Nurse Handoff Report, Index, ED Encounter Summary, ED SBAR, Adult Overview, Surgery Report, Intake/Output, MAR, Recent Results, Med Rec Status, Quality Measures, and Neuro Assessment.

## 2023-08-16 NOTE — PROGRESS NOTES
22:50 - Spoke with on call urologist (Dr. Jose Batista) on the phone. Mention about multiple attempts for straight cath and retention of urine about. No complain of pain. Notified the recent retention is 815cc of urine. Dr. Jose Batista was agitated on the phone and mentioned that if this issue was happed in the morning till late evening why morning team has not done anything. Dr. Jose Batista said \"Patient is not complaining regarding pain and lab looks good for creatinine and BUN than I won't come to that hospital to just put the Richey in at this time. I have 9 hospital to cover. Leave this matter till morning. \"    Notified the response of Dr. Jose Batista and current patient condition to NP- Ramiro Love and supervisor - Jannet.

## 2023-08-16 NOTE — PROGRESS NOTES
11:30 PM - After spoke with urology, NP - Kerry Solares stop by to assess the patient. NP decided to try for Russell for this patient. Received the one time order for lidocaine for russell insertion. 11:50 PM- this RN and NP- zenia entered together to put Russell for this patient. Patient found on the ground. Noticed unwitnessed fall. Bed alarm was on, non-skid socks was on, bed in low position with brake on. Vital sign within the normal range. Put the patient back on the bed. Assess the patient. No complain of pain. All fall and other safety precaution has been taken. Received the CT - head for this patient from NP. Notified the supervisor- Jannet regarding this incident.

## 2023-08-17 VITALS
DIASTOLIC BLOOD PRESSURE: 78 MMHG | BODY MASS INDEX: 24.16 KG/M2 | TEMPERATURE: 97.9 F | HEART RATE: 86 BPM | OXYGEN SATURATION: 96 % | HEIGHT: 72 IN | WEIGHT: 178.35 LBS | RESPIRATION RATE: 15 BRPM | SYSTOLIC BLOOD PRESSURE: 115 MMHG

## 2023-08-17 LAB
ALBUMIN SERPL-MCNC: 3 G/DL (ref 3.5–5)
ALBUMIN/GLOB SERPL: 0.8 (ref 1.1–2.2)
ALP SERPL-CCNC: 67 U/L (ref 45–117)
ALT SERPL-CCNC: 38 U/L (ref 12–78)
ANION GAP SERPL CALC-SCNC: 7 MMOL/L (ref 5–15)
AST SERPL-CCNC: 38 U/L (ref 15–37)
BASOPHILS # BLD: 0 K/UL (ref 0–0.1)
BASOPHILS NFR BLD: 0 % (ref 0–1)
BILIRUB SERPL-MCNC: 1.6 MG/DL (ref 0.2–1)
BUN SERPL-MCNC: 36 MG/DL (ref 6–20)
BUN/CREAT SERPL: 35 (ref 12–20)
CALCIUM SERPL-MCNC: 8.8 MG/DL (ref 8.5–10.1)
CHLORIDE SERPL-SCNC: 106 MMOL/L (ref 97–108)
CO2 SERPL-SCNC: 25 MMOL/L (ref 21–32)
CREAT SERPL-MCNC: 1.02 MG/DL (ref 0.7–1.3)
DIFFERENTIAL METHOD BLD: ABNORMAL
EOSINOPHIL # BLD: 0.2 K/UL (ref 0–0.4)
EOSINOPHIL NFR BLD: 1 % (ref 0–7)
ERYTHROCYTE [DISTWIDTH] IN BLOOD BY AUTOMATED COUNT: 12.4 % (ref 11.5–14.5)
GLOBULIN SER CALC-MCNC: 3.8 G/DL (ref 2–4)
GLUCOSE SERPL-MCNC: 105 MG/DL (ref 65–100)
HCT VFR BLD AUTO: 40.7 % (ref 36.6–50.3)
HGB BLD-MCNC: 13.9 G/DL (ref 12.1–17)
IMM GRANULOCYTES # BLD AUTO: 0 K/UL
IMM GRANULOCYTES NFR BLD AUTO: 0 %
LYMPHOCYTES # BLD: 3.7 K/UL (ref 0.8–3.5)
LYMPHOCYTES NFR BLD: 16 % (ref 12–49)
MCH RBC QN AUTO: 31.7 PG (ref 26–34)
MCHC RBC AUTO-ENTMCNC: 34.2 G/DL (ref 30–36.5)
MCV RBC AUTO: 92.7 FL (ref 80–99)
METAMYELOCYTES NFR BLD MANUAL: 2 %
MONOCYTES # BLD: 0.9 K/UL (ref 0–1)
MONOCYTES NFR BLD: 4 % (ref 5–13)
MYELOCYTES NFR BLD MANUAL: 2 %
NEUTS BAND NFR BLD MANUAL: 1 % (ref 0–6)
NEUTS SEG # BLD: 17.3 K/UL (ref 1.8–8)
NEUTS SEG NFR BLD: 74 % (ref 32–75)
NRBC # BLD: 0 K/UL (ref 0–0.01)
NRBC BLD-RTO: 0 PER 100 WBC
PLATELET # BLD AUTO: 404 K/UL (ref 150–400)
PMV BLD AUTO: 10.4 FL (ref 8.9–12.9)
POTASSIUM SERPL-SCNC: 4.2 MMOL/L (ref 3.5–5.1)
PROT SERPL-MCNC: 6.8 G/DL (ref 6.4–8.2)
RBC # BLD AUTO: 4.39 M/UL (ref 4.1–5.7)
RBC MORPH BLD: ABNORMAL
SODIUM SERPL-SCNC: 138 MMOL/L (ref 136–145)
WBC # BLD AUTO: 23.1 K/UL (ref 4.1–11.1)

## 2023-08-17 PROCEDURE — 6370000000 HC RX 637 (ALT 250 FOR IP): Performed by: HOSPITALIST

## 2023-08-17 PROCEDURE — A4216 STERILE WATER/SALINE, 10 ML: HCPCS | Performed by: HOSPITALIST

## 2023-08-17 PROCEDURE — 80053 COMPREHEN METABOLIC PANEL: CPT

## 2023-08-17 PROCEDURE — 2580000003 HC RX 258: Performed by: FAMILY MEDICINE

## 2023-08-17 PROCEDURE — 6360000002 HC RX W HCPCS: Performed by: HOSPITALIST

## 2023-08-17 PROCEDURE — XW0DXM6 INTRODUCTION OF BARICITINIB INTO MOUTH AND PHARYNX, EXTERNAL APPROACH, NEW TECHNOLOGY GROUP 6: ICD-10-PCS | Performed by: HOSPITALIST

## 2023-08-17 PROCEDURE — 97530 THERAPEUTIC ACTIVITIES: CPT

## 2023-08-17 PROCEDURE — 85025 COMPLETE CBC W/AUTO DIFF WBC: CPT

## 2023-08-17 PROCEDURE — 2700000000 HC OXYGEN THERAPY PER DAY

## 2023-08-17 PROCEDURE — 36415 COLL VENOUS BLD VENIPUNCTURE: CPT

## 2023-08-17 PROCEDURE — 6360000002 HC RX W HCPCS: Performed by: FAMILY MEDICINE

## 2023-08-17 PROCEDURE — 97535 SELF CARE MNGMENT TRAINING: CPT

## 2023-08-17 PROCEDURE — 94640 AIRWAY INHALATION TREATMENT: CPT

## 2023-08-17 PROCEDURE — 6370000000 HC RX 637 (ALT 250 FOR IP): Performed by: FAMILY MEDICINE

## 2023-08-17 PROCEDURE — 2500000003 HC RX 250 WO HCPCS: Performed by: HOSPITALIST

## 2023-08-17 PROCEDURE — 2580000003 HC RX 258: Performed by: HOSPITALIST

## 2023-08-17 RX ORDER — DEXAMETHASONE 6 MG/1
6 TABLET ORAL DAILY
Qty: 10 TABLET | Refills: 0 | Status: SHIPPED | OUTPATIENT
Start: 2023-08-18 | End: 2023-08-28

## 2023-08-17 RX ORDER — PANTOPRAZOLE SODIUM 40 MG/1
40 TABLET, DELAYED RELEASE ORAL DAILY
Qty: 30 TABLET | Refills: 0 | Status: SHIPPED | OUTPATIENT
Start: 2023-08-17 | End: 2023-09-16

## 2023-08-17 RX ORDER — BENZONATATE 100 MG/1
100 CAPSULE ORAL 3 TIMES DAILY PRN
Qty: 15 CAPSULE | Refills: 0 | Status: SHIPPED | OUTPATIENT
Start: 2023-08-17 | End: 2023-08-24

## 2023-08-17 RX ADMIN — IPRATROPIUM BROMIDE AND ALBUTEROL SULFATE 1 DOSE: 2.5; .5 SOLUTION RESPIRATORY (INHALATION) at 07:56

## 2023-08-17 RX ADMIN — AZITHROMYCIN MONOHYDRATE 500 MG: 500 INJECTION, POWDER, LYOPHILIZED, FOR SOLUTION INTRAVENOUS at 10:02

## 2023-08-17 RX ADMIN — DEXAMETHASONE 6 MG: 4 TABLET ORAL at 10:01

## 2023-08-17 RX ADMIN — FAMOTIDINE 20 MG: 10 INJECTION, SOLUTION INTRAVENOUS at 10:02

## 2023-08-17 RX ADMIN — ENOXAPARIN SODIUM 40 MG: 100 INJECTION SUBCUTANEOUS at 10:01

## 2023-08-17 RX ADMIN — SODIUM CHLORIDE, PRESERVATIVE FREE 10 ML: 5 INJECTION INTRAVENOUS at 10:03

## 2023-08-17 RX ADMIN — CHLORHEXIDINE GLUCONATE 15 ML: 1.2 RINSE ORAL at 10:03

## 2023-08-17 RX ADMIN — ACETYLCYSTEINE 800 MG: 200 SOLUTION ORAL; RESPIRATORY (INHALATION) at 07:56

## 2023-08-17 RX ADMIN — BARICITINIB 4 MG: 2 TABLET, FILM COATED ORAL at 10:01

## 2023-08-17 NOTE — PLAN OF CARE
Problem: Physical Therapy - Adult  Goal: By Discharge: Performs mobility at highest level of function for planned discharge setting. See evaluation for individualized goals. Description: FUNCTIONAL STATUS PRIOR TO ADMISSION: patient lives alone with caregiver 5 days a week. Patient not reliable historian but granddaughter attempting to give history to best of her knowledge. Patient ambulatory with RW; goes to outpatient PT    HOME SUPPORT PRIOR TO ADMISSION: The patient lived alone with caregiver to provide assistance. Physical Therapy Goals  Initiated 8/15/2023  1. Patient will move from supine to sit and sit to supine, scoot up and down, and roll side to side in bed with supervision/set-up within 7 day(s). 2.  Patient will perform sit to stand with supervision/set-up within 7 day(s). 3.  Patient will transfer from bed to chair and chair to bed with supervision/set-up using the least restrictive device within 7 day(s). 4.  Patient will ambulate with supervision/set-up for 50 feet with the least restrictive device within 7 day(s). 5.  Patient will ascend/descend 3 stairs with  handrail(s) with contact guard assist within 7 day(s). Outcome: Progressing   PHYSICAL THERAPY TREATMENT    Patient: Espinoza Argueta (68 y.o. male)  Date: 8/17/2023  Diagnosis: COVID-19 virus infection [U07.1] COVID-19 virus infection      Precautions: Bed Alarm                    ASSESSMENT:  Patient continues to benefit from skilled PT services and is slowly progressing towards goals. Patient remains limited by significant hearing loss and not \"understanding\" directions. Overall minimal assist once movement is initiated . Gait is very slow but did respond well to direction to increase step length. Left up in chair with granddaughter at his side. Continue to recommend rehab post discharge. PLAN:  Patient continues to benefit from skilled intervention to address the above impairments.   Continue treatment per established plan of care. Recommendation for discharge: (in order for the patient to meet his/her long term goals): Therapy up to 5 days/week in Skilled nursing facility    Other factors to consider for discharge: lives alone, poor safety awareness, impaired cognition, high risk for falls, not safe to be alone, and concern for safely navigating or managing the home environment    IF patient discharges home will need the following DME: continuing to assess with progress       SUBJECTIVE:   Patient stated, \"I'm too weak to get up. \"    OBJECTIVE DATA SUMMARY:   Critical Behavior:          Functional Mobility Training:  Bed Mobility:  Bed Mobility Training  Supine to Sit: Minimum assistance; Additional time  Scooting: Supervision  Transfers:  Transfer Training  Transfer Training: Yes  Overall Level of Assistance: Minimum assistance  Interventions: Tactile cues  Sit to Stand: Minimum assistance  Stand to Sit: Contact-guard assistance  Stand Pivot Transfers: Minimum assistance  Bed to Chair: Minimum assistance (assist for RW mgmt around bedroom obstacles; poor hand placement requiring facilitation)  Balance:  Balance  Sitting: Impaired  Sitting - Static: Good (unsupported)  Sitting - Dynamic: Good (unsupported)  Standing: Impaired  Standing - Static: Constant support; Fair  Standing - Dynamic: Constant support; Fair   Ambulation/Gait Training:     Gait  Base of Support: Widened  Speed/Lydia: Shuffled;Pace decreased (< 100 feet/min)  Step Length: Left shortened;Right shortened  Gait Abnormalities: Decreased step clearance  Distance (ft): 5 Feet  Assistive Device: Gait belt;Walker, rolling  Activity Tolerance:   Fair  and SpO2 stable on room air but nursing requests to leave O2 on post session.     After treatment:   Patient left in no apparent distress sitting up in chair, Call bell within reach, Bed/ chair alarm activated, and Caregiver / family present      COMMUNICATION/EDUCATION:   The patient's plan of care was

## 2023-08-17 NOTE — DISCHARGE INSTRUCTIONS
Discharge Instructions       PATIENT ID: Conor Schnedier  MRN: [de-identified]   YOB: 1933    DATE OF ADMISSION: 8/14/2023   DATE OF DISCHARGE: 8/17/2023    PRIMARY CARE PROVIDER: Zack Chambers     ATTENDING PHYSICIAN: Ivan Bailey MD   DISCHARGING PROVIDER: Ivan Bailey MD    To contact this individual call [phone] and ask the  to page. If unavailable ask to be transferred the Adult Hospitalist Department. DISCHARGE DIAGNOSES COVID 19     CONSULTATIONS: [unfilled]    PROCEDURES/SURGERIES: * No surgery found *    PENDING TEST RESULTS:   At the time of discharge the following test results are still pending:     FOLLOW UP APPOINTMENTS:   [facility]     ADDITIONAL CARE RECOMMENDATIONS:     DIET: cardiac diet    ACTIVITY: activity as tolerated    WOUND CARE:     EQUIPMENT needed:       DISCHARGE MEDICATIONS:   See Medication Reconciliation Form    It is important that you take the medication exactly as they are prescribed. Keep your medication in the bottles provided by the pharmacist and keep a list of the medication names, dosages, and times to be taken in your wallet. Do not take other medications without consulting your doctor. NOTIFY YOUR PHYSICIAN FOR ANY OF THE FOLLOWING:   Fever over 101 degrees for 24 hours. Chest pain, shortness of breath, fever, chills, nausea, vomiting, diarrhea, change in mentation, falling, weakness, bleeding. Severe pain or pain not relieved by medications. Or, any other signs or symptoms that you may have questions about.       DISPOSITION:  x  Home With:   OT  PT  HH  RN       SNF/Inpatient Rehab/LTAC    Independent/assisted living    Hospice    Other:     CDMP Checked:   Yes x     PROBLEM LIST Updated:  Yes x       Signed:   Ivan Bailey MD  8/17/2023  12:58 PM

## 2023-08-17 NOTE — PLAN OF CARE
Problem: Safety - Adult  Goal: Free from fall injury  Outcome: Progressing    Problem: Skin/Tissue Integrity  Goal: Absence of new skin breakdown  Description: 1. Monitor for areas of redness and/or skin breakdown  2. Assess vascular access sites hourly  3. Every 4-6 hours minimum:  Change oxygen saturation probe site  4. Every 4-6 hours:  If on nasal continuous positive airway pressure, respiratory therapy assess nares and determine need for appliance change or resting period.   Outcome: Progressing

## 2023-08-17 NOTE — PROGRESS NOTES
Upon documenting pt's assessment, visualized CMP completed on 8/15 had a potassium level of 5.3. Notified Dwayne Choi NP of update and orders have been acknowledged for CMP and CBC draw on 8/17.

## 2023-08-17 NOTE — DISCHARGE SUMMARY
Discharge Summary       PATIENT ID: Aric Callaway  MRN: [de-identified]   YOB: 1933    DATE OF ADMISSION: 8/14/2023 12:43 AM    DATE OF DISCHARGE: 8/17/23   PRIMARY CARE PROVIDER: Jordyn Ross MD     ATTENDING PHYSICIAN: Henok Kelley  DISCHARGING PROVIDER: Henok Kelley MD    To contact this individual call [phone] and ask the  to page. If unavailable ask to be transferred the Adult Hospitalist Department. CONSULTATIONS: IP CONSULT TO PHYSICAL THERAPY  IP CONSULT TO OCCUPATIONAL THERAPY  IP CONSULT TO UROLOGY    PROCEDURES/SURGERIES: * No surgery found *    ADMITTING [facility] COURSE:  CHRISTIE Roche is a 80 y.o. male with no known significant past (pre-hospital) medical history presented as a direct admission/ transfer from [facility] in [place], [place] to [facility] with diagnoses of COVID 19 virus infection and acute hypoxic respiratory failure. Patient is a non-historian, not answering questions. Majority of history is obtained per review of transfer records from [facility].   Per these reports, patient was hospitalized at [facility] from 8/7/2023 to 8/13/2023. Patient initially was admitted 8/7/2023 after being found down on the floor at home, with cough, hypoxia. Patient was diagnosed with acute hypoxic respiratory failure, pneumonia due to COVID-19 virus infection, asymptomatic bacteriuria, elevated BNP,, leukocytosis, mild hyponatremia, dehydration, elevated D-dimer, thyroid nodule, urinary retention requiring indwelling Richey catheter insertion. Imaging has shown bilateral Monia. Patient was admitted to medical service. Patient was started on remdesivir, baricitinib, Decadron, DuoNebs, and oxygen supplementation. Patient reportedly decompensated on the floor requiring transfer to the ICU with BiPAP. Patient was weaned off of BiPAP and continued on high flow oxygen. change in mentation, falling, weakness, bleeding. Severe pain or pain not relieved by medications. Or, any other signs or symptoms that you may have questions about.     DISPOSITION:    Home With:   OT  PT  HH  RN      x Long term SNF/Inpatient Rehab    Independent/assisted living    Hospice    Other:       PATIENT CONDITION AT DISCHARGE:     Functional status   x Poor     Deconditioned     Independent      Cognition     Lucid     Forgetful    x Dementia      Catheters/lines (plus indication)   x Richey     PICC     PEG     None      Code status     Full code    x DNR      PHYSICAL EXAMINATION AT DISCHARGE:    GGeneral : awake, no acute distress, on oxygen 1 L  HEENT: PEERL, EOMI, moist mucus membrane  Neck: supple, no JVD, no meningeal signs  Chest: Clear to auscultation bilaterally   CVS: S1 S2 heard, Capillary refill less than 2 seconds  Abd: soft/ non tender, non distended, BS physiological,   Ext: no clubbing, no cyanosis, no edema, brisk 2+ DP pulses  Neuro/Psych: pleasant mood and affect  Skin: warm                     CHRONIC MEDICAL DIAGNOSES:      Greater than 31 minutes were spent with the patient on counseling and coordination of care    Signed:   Yaa Byers MD  8/17/2023  1:00 PM

## 2023-08-17 NOTE — PROGRESS NOTES
Palliative note    Asked by Mary Dc Matt-daughter/POA to assist with goals of care/next steps. We talked on Sunday and assisted her with transferring patient to [facility] to be closer to family and consider rehabilitation at [facility] where she works. Discussion today was reviewing his current status-still with a leukocytosis but procalcitonin is normal, CRP mildly elevated-not unusual in COVID, oxygen saturations mid 90s on 2 L. He does fatigue easily but was more interactive yesterday with family. Goal remains to attempt PT/OT to see how much he can recover and hopefully be able to return to his home in [place]. Mary Vanda understands if transition to skilled nursing facility goes poorly, she can reach out to me for potential hospice discussion. Appreciate participating in care and let me know if anything else I can do.

## 2023-08-17 NOTE — CARE COORDINATION
Transition of Care Plan to SNF/Rehab    Communication to Patient/Family:  Met with patient and family and they are agreeable to the transition plan. The Plan for Transition of Care is related to the following treatment goals: SNF    The Patient and/or patient representative was provided with a choice of provider and agrees  with the discharge plan. Yes [] No []    A Freedom of choice list was provided with basic dialogue that supports the patient's individualized plan of care/goals and shares the quality data associated with the providers. Yes [x] No []    SNF/Rehab Transition:  Patient has been accepted to [address] [place] [address] SNF/Rehab and meets criteria for admission. Patient will transported by Hospital to home and expected to leave at 6 pm    Communication to SNF/Rehab:  Bedside RN, Maurilio Stone, has been notified to update the transition plan to the facility and call report (). Discharge information has been updated on the AVS. And communicated to facility via [employer]/All [employer]s, or CC link. Nursing Please include all hard scripts for controlled substances, med rec and dc summary, and AVS in packet. Reviewed and confirmed with facility, [address] [place] [address]  can manage the patient care needs for the following:     Daphne Schroeder with (X) only those applicable:  Medication:  []Medications are available at the facility  []IV Antibiotics    []Controlled Substance - hard copies available sent.   []Weekly Labs    Equipment:  []CPAP/BiPAP  []Wound Vacuum  []Richey or Urinary Device  []PICC/Central Line  []Nebulizer  []Ventilator    Treatment:  []Isolation (for MRSA, VRE, etc.)  []Surgical Drain Management  []Tracheostomy Care  []Dressing Changes  []Dialysis with transportation  []PEG Care  []Oxygen  []Daily Weights for Heart Failure    Dietary:  []Any diet limitations  []Tube Feedings   []Total Parenteral Management (TPN)    Financial Resources:  []Medicaid Application Completed    []UAI Completed and copy given to pt/family  and copy given to pt/family  []A screening has previously been completed. []Level II Completed    [] Private pay individual who will not become   financially eligible for Medicaid within 6 months from admission to a [address] [place] [address] facility. [] Individual refused to have screening conducted. []Medicaid Application Completed    []The screening denied because it was determined individual did not need/did not qualify for nursing facility level of care. [] Out of state residents seeking direct admission to a [facility]. [] Individuals who are inpatients of an out of state hospital, or in state or out of state veterans/ hospital and seek direct admission to a [facility]  [] Individuals who are pateints or residents of a state owned/operated facility that is licensed by Department of [employer]) and seek direct admission to [address] [place] [address]  [] A screening not required for enrollment in [address] [place] [address] as set out in [address] [place] [address] 33-  [] [facility]) staff shall perform screenings of the [facility] clients. Advanced Care Plan:  []Surrogate Decision Maker of Care  []POA  []Communicated Code Status and copy sent. Other:  Patient anticipated returning back home with rehab.          Bettye Zuleta, Jonny0 JUSTIN [place] Management Department  XB:[phone]

## 2023-08-17 NOTE — PROGRESS NOTES
Renal Dosing/Monitoring  Medication: Famotidine   Current regimen:  20 mg IV daily  Recent Labs     08/15/23  1323 08/17/23  0541   CREATININE 1.16 1.02   BUN 30* 36*     Estimated CrCl:  54 ml/min  Plan: Change to famotidine 20 mg IV twice daily per [facility] P&T Committee Protocol with respect to renal function (CrCl >50 ml/min). Pharmacy will continue to monitor patient daily and will make dosage adjustments based upon changing renal function.

## 2023-08-17 NOTE — PLAN OF CARE
Problem: Physical Therapy - Adult  Goal: By Discharge: Performs mobility at highest level of function for planned discharge setting. See evaluation for individualized goals. Description: FUNCTIONAL STATUS PRIOR TO ADMISSION: patient lives alone with caregiver 5 days a week. Patient not reliable historian but granddaughter attempting to give history to best of her knowledge. Patient ambulatory with RW; goes to outpatient PT    HOME SUPPORT PRIOR TO ADMISSION: The patient lived alone with caregiver to provide assistance. Physical Therapy Goals  Initiated 8/15/2023  1. Patient will move from supine to sit and sit to supine, scoot up and down, and roll side to side in bed with supervision/set-up within 7 day(s). 2.  Patient will perform sit to stand with supervision/set-up within 7 day(s). 3.  Patient will transfer from bed to chair and chair to bed with supervision/set-up using the least restrictive device within 7 day(s). 4.  Patient will ambulate with supervision/set-up for 50 feet with the least restrictive device within 7 day(s). 5.  Patient will ascend/descend 3 stairs with  handrail(s) with contact guard assist within 7 day(s). 8/17/2023 1351 by Katheryn Allison PT  Outcome: Progressing     Problem: Occupational Therapy - Adult  Goal: By Discharge: Performs self-care activities at highest level of function for planned discharge setting. See evaluation for individualized goals. Description: FUNCTIONAL STATUS PRIOR TO ADMISSION:  Patient was ambulatory using RW, mod I for toileting, has a care aide daily who will assist pt with bathing and dressing as needed. Pt is very [place] and granddaughter present to assist with PLOF. Receives Help From: Personal care attendant, Outpatient therapy for strengthening and balance,       HOME SUPPORT: Patient lived alone with care aide and family to provide assistance. Occupational Therapy Goals:  Initiated 8/15/2023  1.   Patient will perform standing ADLs